# Patient Record
Sex: FEMALE | Race: BLACK OR AFRICAN AMERICAN
[De-identification: names, ages, dates, MRNs, and addresses within clinical notes are randomized per-mention and may not be internally consistent; named-entity substitution may affect disease eponyms.]

---

## 2017-08-18 ENCOUNTER — HOSPITAL ENCOUNTER (OUTPATIENT)
Dept: HOSPITAL 35 - CAT | Age: 52
End: 2017-08-18
Attending: FAMILY MEDICINE
Payer: COMMERCIAL

## 2017-08-18 DIAGNOSIS — K76.89: Primary | ICD-10-CM

## 2017-09-15 ENCOUNTER — HOSPITAL ENCOUNTER (OUTPATIENT)
Dept: HOSPITAL 35 - PAIN | Age: 52
Discharge: HOME | End: 2017-09-15
Attending: ANESTHESIOLOGY
Payer: COMMERCIAL

## 2017-09-15 VITALS — HEIGHT: 64.02 IN | WEIGHT: 232 LBS | BODY MASS INDEX: 39.61 KG/M2

## 2017-09-15 VITALS — DIASTOLIC BLOOD PRESSURE: 94 MMHG | SYSTOLIC BLOOD PRESSURE: 158 MMHG

## 2017-09-15 DIAGNOSIS — M54.16: Primary | ICD-10-CM

## 2017-09-15 DIAGNOSIS — J45.909: ICD-10-CM

## 2017-09-15 DIAGNOSIS — K21.9: ICD-10-CM

## 2017-09-28 ENCOUNTER — HOSPITAL ENCOUNTER (OUTPATIENT)
Dept: HOSPITAL 35 - PAIN | Age: 52
Discharge: HOME | End: 2017-09-28
Attending: ANESTHESIOLOGY
Payer: COMMERCIAL

## 2017-09-28 VITALS — SYSTOLIC BLOOD PRESSURE: 149 MMHG | DIASTOLIC BLOOD PRESSURE: 90 MMHG

## 2017-09-28 VITALS — WEIGHT: 233.8 LBS | BODY MASS INDEX: 39.91 KG/M2 | HEIGHT: 64.02 IN

## 2017-09-28 DIAGNOSIS — M54.16: Primary | ICD-10-CM

## 2017-12-21 ENCOUNTER — HOSPITAL ENCOUNTER (OUTPATIENT)
Dept: HOSPITAL 35 - PAIN | Age: 52
End: 2017-12-21
Attending: ANESTHESIOLOGY
Payer: COMMERCIAL

## 2017-12-21 VITALS — BODY MASS INDEX: 39.95 KG/M2 | WEIGHT: 234 LBS | HEIGHT: 64.02 IN

## 2017-12-21 VITALS — DIASTOLIC BLOOD PRESSURE: 88 MMHG | SYSTOLIC BLOOD PRESSURE: 133 MMHG

## 2017-12-21 DIAGNOSIS — M79.1: ICD-10-CM

## 2017-12-21 DIAGNOSIS — M16.12: Primary | ICD-10-CM

## 2017-12-21 DIAGNOSIS — M54.16: ICD-10-CM

## 2017-12-21 DIAGNOSIS — M25.752: ICD-10-CM

## 2017-12-29 ENCOUNTER — HOSPITAL ENCOUNTER (OUTPATIENT)
Dept: HOSPITAL 35 - PAIN | Age: 52
Discharge: HOME | End: 2017-12-29
Payer: COMMERCIAL

## 2017-12-29 VITALS — SYSTOLIC BLOOD PRESSURE: 131 MMHG | DIASTOLIC BLOOD PRESSURE: 84 MMHG

## 2017-12-29 VITALS — HEIGHT: 64.02 IN | WEIGHT: 233 LBS | BODY MASS INDEX: 39.78 KG/M2

## 2017-12-29 DIAGNOSIS — G89.29: ICD-10-CM

## 2017-12-29 DIAGNOSIS — M16.11: ICD-10-CM

## 2017-12-29 DIAGNOSIS — M54.16: ICD-10-CM

## 2017-12-29 DIAGNOSIS — Z88.8: ICD-10-CM

## 2017-12-29 DIAGNOSIS — Z79.899: ICD-10-CM

## 2017-12-29 DIAGNOSIS — Z88.6: ICD-10-CM

## 2017-12-29 DIAGNOSIS — M79.1: Primary | ICD-10-CM

## 2018-01-15 ENCOUNTER — HOSPITAL ENCOUNTER (OUTPATIENT)
Dept: HOSPITAL 35 - PAIN | Age: 53
End: 2018-01-15
Attending: ANESTHESIOLOGY
Payer: COMMERCIAL

## 2018-01-15 VITALS — DIASTOLIC BLOOD PRESSURE: 80 MMHG | SYSTOLIC BLOOD PRESSURE: 126 MMHG

## 2018-01-15 VITALS — HEIGHT: 64.02 IN | BODY MASS INDEX: 38.58 KG/M2 | WEIGHT: 226 LBS

## 2018-01-15 DIAGNOSIS — Z79.899: ICD-10-CM

## 2018-01-15 DIAGNOSIS — M79.1: ICD-10-CM

## 2018-01-15 DIAGNOSIS — M54.16: Primary | ICD-10-CM

## 2018-02-12 ENCOUNTER — HOSPITAL ENCOUNTER (OUTPATIENT)
Dept: HOSPITAL 35 - PAIN | Age: 53
Discharge: HOME | End: 2018-02-12
Attending: ANESTHESIOLOGY
Payer: COMMERCIAL

## 2018-02-12 VITALS — BODY MASS INDEX: 39.44 KG/M2 | HEIGHT: 64.02 IN | WEIGHT: 231 LBS

## 2018-02-12 VITALS — DIASTOLIC BLOOD PRESSURE: 80 MMHG | SYSTOLIC BLOOD PRESSURE: 131 MMHG

## 2018-02-12 DIAGNOSIS — Z88.8: ICD-10-CM

## 2018-02-12 DIAGNOSIS — Z79.899: ICD-10-CM

## 2018-02-12 DIAGNOSIS — M79.1: Primary | ICD-10-CM

## 2018-02-12 DIAGNOSIS — Z90.710: ICD-10-CM

## 2018-02-12 DIAGNOSIS — G89.29: ICD-10-CM

## 2018-02-12 DIAGNOSIS — Z98.890: ICD-10-CM

## 2018-04-19 ENCOUNTER — HOSPITAL ENCOUNTER (OUTPATIENT)
Dept: HOSPITAL 35 - PAIN | Age: 53
End: 2018-04-19
Attending: ANESTHESIOLOGY
Payer: COMMERCIAL

## 2018-04-19 VITALS — HEIGHT: 64.02 IN | BODY MASS INDEX: 40.29 KG/M2 | WEIGHT: 236 LBS

## 2018-04-19 VITALS — SYSTOLIC BLOOD PRESSURE: 141 MMHG | DIASTOLIC BLOOD PRESSURE: 91 MMHG
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
## 2018-04-19 DIAGNOSIS — M54.16: Primary | ICD-10-CM

## 2018-04-19 DIAGNOSIS — M70.61: ICD-10-CM

## 2018-04-27 ENCOUNTER — HOSPITAL ENCOUNTER (OUTPATIENT)
Dept: HOSPITAL 35 - PAIN | Age: 53
Discharge: HOME | End: 2018-04-27
Attending: ANESTHESIOLOGY
Payer: COMMERCIAL

## 2018-04-27 VITALS — SYSTOLIC BLOOD PRESSURE: 150 MMHG | DIASTOLIC BLOOD PRESSURE: 84 MMHG

## 2018-04-27 VITALS — WEIGHT: 233 LBS | HEIGHT: 64.02 IN | BODY MASS INDEX: 39.78 KG/M2

## 2018-04-27 DIAGNOSIS — Z98.890: ICD-10-CM

## 2018-04-27 DIAGNOSIS — G89.29: ICD-10-CM

## 2018-04-27 DIAGNOSIS — Z90.710: ICD-10-CM

## 2018-04-27 DIAGNOSIS — Z79.899: ICD-10-CM

## 2018-04-27 DIAGNOSIS — Z88.8: ICD-10-CM

## 2018-04-27 DIAGNOSIS — M16.11: Primary | ICD-10-CM

## 2018-04-27 DIAGNOSIS — Z79.891: ICD-10-CM

## 2018-10-17 ENCOUNTER — HOSPITAL ENCOUNTER (OUTPATIENT)
Dept: HOSPITAL 35 - PAIN | Age: 53
End: 2018-10-17
Payer: COMMERCIAL

## 2018-10-17 VITALS — SYSTOLIC BLOOD PRESSURE: 142 MMHG | DIASTOLIC BLOOD PRESSURE: 82 MMHG

## 2018-10-17 VITALS — HEIGHT: 64.02 IN | BODY MASS INDEX: 39.57 KG/M2 | WEIGHT: 231.8 LBS

## 2018-10-17 DIAGNOSIS — M25.562: ICD-10-CM

## 2018-10-17 DIAGNOSIS — M25.552: Primary | ICD-10-CM

## 2018-10-17 DIAGNOSIS — G89.29: ICD-10-CM

## 2018-10-17 DIAGNOSIS — Z79.899: ICD-10-CM

## 2018-10-17 DIAGNOSIS — M79.652: ICD-10-CM

## 2019-01-21 ENCOUNTER — HOSPITAL ENCOUNTER (OUTPATIENT)
Dept: HOSPITAL 35 - RAD | Age: 54
End: 2019-01-21
Attending: FAMILY MEDICINE
Payer: COMMERCIAL

## 2019-01-21 DIAGNOSIS — Z12.31: Primary | ICD-10-CM

## 2020-01-20 ENCOUNTER — HOSPITAL ENCOUNTER (OUTPATIENT)
Dept: HOSPITAL 35 - BC | Age: 55
End: 2020-01-20
Attending: FAMILY MEDICINE
Payer: COMMERCIAL

## 2020-01-20 DIAGNOSIS — Z12.31: Primary | ICD-10-CM

## 2021-01-18 ENCOUNTER — HOSPITAL ENCOUNTER (OUTPATIENT)
Dept: HOSPITAL 35 - RAD | Age: 56
End: 2021-01-18
Attending: FAMILY MEDICINE
Payer: COMMERCIAL

## 2021-01-18 DIAGNOSIS — Z12.31: Primary | ICD-10-CM

## 2021-07-09 ENCOUNTER — HOSPITAL ENCOUNTER (OUTPATIENT)
Dept: HOSPITAL 35 - PAIN | Age: 56
Discharge: HOME | End: 2021-07-09
Payer: COMMERCIAL

## 2021-07-09 VITALS — DIASTOLIC BLOOD PRESSURE: 76 MMHG | SYSTOLIC BLOOD PRESSURE: 125 MMHG

## 2021-07-09 VITALS — BODY MASS INDEX: 38.41 KG/M2 | HEIGHT: 64.02 IN | WEIGHT: 225 LBS

## 2021-07-09 DIAGNOSIS — M54.16: Primary | ICD-10-CM

## 2021-07-09 DIAGNOSIS — J45.909: ICD-10-CM

## 2021-07-09 DIAGNOSIS — Z90.49: ICD-10-CM

## 2021-07-09 DIAGNOSIS — I10: ICD-10-CM

## 2021-07-09 DIAGNOSIS — Z98.890: ICD-10-CM

## 2021-07-09 DIAGNOSIS — D64.9: ICD-10-CM

## 2021-07-09 DIAGNOSIS — K21.9: ICD-10-CM

## 2021-07-09 DIAGNOSIS — Z88.8: ICD-10-CM

## 2021-07-09 DIAGNOSIS — Z79.899: ICD-10-CM

## 2021-07-09 DIAGNOSIS — M19.90: ICD-10-CM

## 2021-07-09 DIAGNOSIS — G89.29: ICD-10-CM

## 2021-07-09 DIAGNOSIS — Z98.51: ICD-10-CM

## 2021-07-09 NOTE — NUR
Pain Clinic Assessment:
 
1. History of Osteoarthritis:
Left Lower Extremity
Right Lower Extremity
   History of Rheumatoid Arthritis:
Not Applicable
 
2. Height: 5 ft. 4 in. 162.6 cm.
   Weight: 225.0 lb.  oz. 102.060 kg.
   Patient's BMI: 38.6
 
3. Vital Signs:
   BP: 125/76 Pulse: 95 Resp: 16
   Temp:  02 Sat: 100 ECG Mon:
 
4. Pain Intensity: 6
 
5. Fall Risk:
   Dizziness: N  Needs help standing or walking: N
   Fallen in the last 3 months: N
   Fall risk comments:
 
 
6. Patient on Blood Thinner: None
 
7. History of Hypertension: Y
 
8. Opioid Therapy greater than 6 weeks: N
   Opiate Contract Signed:
 
9. Risk Assessment Tool Provided: 1 LOW RISK
 
10. Functional Assessment Tool: 44/70
 
11. Recreational Drug Use: Never Drug Type:
    Tobacco Use: Never Smoker Tobacco Type:
       Amount or Packs/day:  How Many Years:
    Alcohol Use: Yes  Frequency: Special Occasions Quant: 2

## 2021-10-11 ENCOUNTER — HOSPITAL ENCOUNTER (OUTPATIENT)
Dept: HOSPITAL 35 - RAD | Age: 56
End: 2021-10-11
Attending: INTERNAL MEDICINE
Payer: COMMERCIAL

## 2021-10-11 DIAGNOSIS — R06.00: Primary | ICD-10-CM

## 2021-11-12 ENCOUNTER — HOSPITAL ENCOUNTER (OUTPATIENT)
Dept: HOSPITAL 35 - PAIN | Age: 56
Discharge: HOME | End: 2021-11-12
Payer: COMMERCIAL

## 2021-11-12 VITALS — BODY MASS INDEX: 39.81 KG/M2 | WEIGHT: 233.2 LBS | HEIGHT: 64.02 IN

## 2021-11-12 VITALS — DIASTOLIC BLOOD PRESSURE: 77 MMHG | SYSTOLIC BLOOD PRESSURE: 128 MMHG

## 2021-11-12 DIAGNOSIS — Z90.49: ICD-10-CM

## 2021-11-12 DIAGNOSIS — I10: ICD-10-CM

## 2021-11-12 DIAGNOSIS — D64.9: ICD-10-CM

## 2021-11-12 DIAGNOSIS — Z90.710: ICD-10-CM

## 2021-11-12 DIAGNOSIS — Z98.51: ICD-10-CM

## 2021-11-12 DIAGNOSIS — Z88.8: ICD-10-CM

## 2021-11-12 DIAGNOSIS — K21.9: ICD-10-CM

## 2021-11-12 DIAGNOSIS — M54.16: Primary | ICD-10-CM

## 2021-11-12 DIAGNOSIS — Z98.890: ICD-10-CM

## 2021-11-12 DIAGNOSIS — M19.90: ICD-10-CM

## 2021-11-12 DIAGNOSIS — J45.909: ICD-10-CM

## 2021-11-12 DIAGNOSIS — G89.29: ICD-10-CM

## 2021-11-12 DIAGNOSIS — Z79.899: ICD-10-CM

## 2021-11-12 NOTE — NUR
Pain Clinic Assessment:
 
1. History of Osteoarthritis:
Left Lower Extremity
Right Lower Extremity
   History of Rheumatoid Arthritis:
Not Applicable
 
2. Height: 5 ft. 4 in. 162.6 cm.
   Weight: 233.2 lb.  oz. 105.779 kg.
   Patient's BMI: 40.0
 
3. Vital Signs:
   BP: 128/77 Pulse: 99 Resp: 16
   Temp:  02 Sat: 100 ECG Mon:
 
4. Pain Intensity: 7-bending 1-sitting
 
5. Fall Risk:
   Dizziness: N  Needs help standing or walking: N
   Fallen in the last 3 months: N
   Fall risk comments:
 
 
6. Patient on Blood Thinner: None
 
7. History of Hypertension: Y
 
8. Opioid Therapy greater than 6 weeks: N
   Opiate Contract Signed:
 
9. Risk Assessment Tool Provided: 1 LOW RISK
 
10. Functional Assessment Tool: 44/70
 
11. Recreational Drug Use: Never Drug Type:
    Tobacco Use: Never Smoker Tobacco Type:
       Amount or Packs/day:  How Many Years:
    Alcohol Use: Yes  Frequency: Special Occasions Quant:

## 2022-01-06 ENCOUNTER — HOSPITAL ENCOUNTER (EMERGENCY)
Dept: HOSPITAL 35 - ER | Age: 57
LOS: 1 days | Discharge: HOME | End: 2022-01-07
Payer: COMMERCIAL

## 2022-01-06 VITALS — BODY MASS INDEX: 31.65 KG/M2 | HEIGHT: 65 IN | WEIGHT: 189.99 LBS

## 2022-01-06 DIAGNOSIS — Z98.890: ICD-10-CM

## 2022-01-06 DIAGNOSIS — Z79.899: ICD-10-CM

## 2022-01-06 DIAGNOSIS — Z91.041: ICD-10-CM

## 2022-01-06 DIAGNOSIS — R55: Primary | ICD-10-CM

## 2022-01-06 DIAGNOSIS — J45.909: ICD-10-CM

## 2022-01-06 DIAGNOSIS — Z90.710: ICD-10-CM

## 2022-01-06 DIAGNOSIS — Z90.49: ICD-10-CM

## 2022-01-06 DIAGNOSIS — Z91.09: ICD-10-CM

## 2022-01-06 DIAGNOSIS — Z88.6: ICD-10-CM

## 2022-01-06 DIAGNOSIS — Z88.5: ICD-10-CM

## 2022-01-06 DIAGNOSIS — Z88.8: ICD-10-CM

## 2022-01-06 DIAGNOSIS — Z98.51: ICD-10-CM

## 2022-01-06 DIAGNOSIS — Z79.51: ICD-10-CM

## 2022-01-07 VITALS — DIASTOLIC BLOOD PRESSURE: 70 MMHG | SYSTOLIC BLOOD PRESSURE: 151 MMHG

## 2022-01-07 LAB
ANION GAP SERPL CALC-SCNC: 10 MMOL/L (ref 7–16)
BILIRUB UR-MCNC: NEGATIVE MG/DL
BUN SERPL-MCNC: 16 MG/DL (ref 7–18)
CALCIUM SERPL-MCNC: 9.5 MG/DL (ref 8.5–10.1)
CHLORIDE SERPL-SCNC: 103 MMOL/L (ref 98–107)
CO2 SERPL-SCNC: 27 MMOL/L (ref 21–32)
COLOR UR: YELLOW
CREAT SERPL-MCNC: 0.8 MG/DL (ref 0.6–1)
ERYTHROCYTE [DISTWIDTH] IN BLOOD BY AUTOMATED COUNT: 17.5 % (ref 10.5–14.5)
GLUCOSE SERPL-MCNC: 129 MG/DL (ref 74–106)
HCT VFR BLD CALC: 33.9 % (ref 37–47)
HGB BLD-MCNC: 10.9 GM/DL (ref 12–15)
KETONES UR STRIP-MCNC: NEGATIVE MG/DL
MCH RBC QN AUTO: 25.7 PG (ref 26–34)
MCHC RBC AUTO-ENTMCNC: 32 G/DL (ref 28–37)
MCV RBC: 80.1 FL (ref 80–100)
PLATELET # BLD: 347 THOU/UL (ref 150–400)
POTASSIUM SERPL-SCNC: 3.8 MMOL/L (ref 3.5–5.1)
RBC # BLD AUTO: 4.23 MIL/UL (ref 4.2–5)
RBC # UR STRIP: NEGATIVE /UL
SODIUM SERPL-SCNC: 140 MMOL/L (ref 136–145)
SP GR UR STRIP: 1.02 (ref 1–1.03)
URINE CLARITY: CLEAR
URINE GLUCOSE-RANDOM*: NEGATIVE
URINE LEUKOCYTES-REFLEX: NEGATIVE
URINE NITRITE-REFLEX: NEGATIVE
URINE PROTEIN (DIPSTICK): NEGATIVE
UROBILINOGEN UR STRIP-ACNC: 0.2 E.U./DL (ref 0.2–1)
WBC # BLD AUTO: 14.9 THOU/UL (ref 4–11)

## 2022-01-07 NOTE — EKG
[address]  [address]
Phone:  [phone]                    ELECTROCARDIOGRAM REPORT      
_______________________________________________________________________________
 
Name:       JASMIN MCDUFFIE     [facility] #[facility]                     [id number]     Account #:      [id number]  
Admission:  22    Attend Phys:                          
Discharge:  22    Date of Birth:  [birth year]  
                                                          Report #: [id number]
   [id number]
_______________________________________________________________________________
                         [facility] ED
                                       
Test Date:    2022               Test Time:    21:17:48
Pat Name:     JASMIN MCDUFFIE       Department:   
Patient ID:   [id number]            Room:          
Gender:       F                        Technician:   HAMAVERICK
:          1965               Requested By: Wilber Dykes
Order Number: [id number] MD:   Gurmeet Wade
                                 Measurements
Intervals                              Axis          
Rate:         88                       P:            52
WV:           145                      QRS:          38
QRSD:         86                       T:            26
QT:           389                                    
QTc:          471                                    
                           Interpretive Statements
Sinus rhythm
No previous ECG available for comparison
Electronically Signed On 2022 7:46:59 CST by Gurmeet Wade
[email or web address]
 
 
 
 
 
 
 
 
 
 
 
 
 
 
 
 
 
 
 
 
 
 
 
  <ELECTRONICALLY SIGNED>
   By: Gurmeet Wade MD, [facility]    
  22     0746
D: 22 2117                           _____________________________________
T: 22                           Gurmeet Wade MD, FACC      /EPI

## 2022-01-17 ENCOUNTER — HOSPITAL ENCOUNTER (OUTPATIENT)
Dept: HOSPITAL 35 - BC | Age: 57
End: 2022-01-17
Attending: FAMILY MEDICINE
Payer: COMMERCIAL

## 2022-01-17 DIAGNOSIS — Z12.31: Primary | ICD-10-CM

## 2022-01-17 DIAGNOSIS — N63.20: ICD-10-CM

## 2022-01-17 DIAGNOSIS — N64.89: ICD-10-CM

## 2022-01-21 ENCOUNTER — HOSPITAL ENCOUNTER (OUTPATIENT)
Dept: HOSPITAL 35 - ULTRA | Age: 57
End: 2022-01-21
Attending: FAMILY MEDICINE
Payer: COMMERCIAL

## 2022-01-21 DIAGNOSIS — N63.21: Primary | ICD-10-CM

## 2022-01-21 DIAGNOSIS — N60.02: ICD-10-CM

## 2022-02-09 ENCOUNTER — HOSPITAL ENCOUNTER (OUTPATIENT)
Dept: HOSPITAL 35 - CAT | Age: 57
End: 2022-02-09
Attending: INTERNAL MEDICINE
Payer: COMMERCIAL

## 2022-02-09 ENCOUNTER — HOSPITAL ENCOUNTER (OUTPATIENT)
Dept: HOSPITAL 35 - PAIN | Age: 57
Discharge: HOME | End: 2022-02-09
Payer: COMMERCIAL

## 2022-02-09 VITALS — HEIGHT: 64.02 IN | WEIGHT: 232 LBS | BODY MASS INDEX: 39.61 KG/M2

## 2022-02-09 VITALS — DIASTOLIC BLOOD PRESSURE: 77 MMHG | SYSTOLIC BLOOD PRESSURE: 146 MMHG

## 2022-02-09 DIAGNOSIS — Z90.710: ICD-10-CM

## 2022-02-09 DIAGNOSIS — I25.10: ICD-10-CM

## 2022-02-09 DIAGNOSIS — Z79.899: ICD-10-CM

## 2022-02-09 DIAGNOSIS — Z98.890: ICD-10-CM

## 2022-02-09 DIAGNOSIS — M19.90: ICD-10-CM

## 2022-02-09 DIAGNOSIS — M54.16: Primary | ICD-10-CM

## 2022-02-09 DIAGNOSIS — E78.00: ICD-10-CM

## 2022-02-09 DIAGNOSIS — Z13.6: Primary | ICD-10-CM

## 2022-02-09 DIAGNOSIS — G89.29: ICD-10-CM

## 2022-02-09 NOTE — NUR
Pain Clinic Assessment:
 
1. History of Osteoarthritis:
Left Lower Extremity
Right Lower Extremity
   History of Rheumatoid Arthritis:
Not Applicable
 
2. Height: 5 ft. 4 in. 162.6 cm.
   Weight: 232.0 lb.  oz. 105.235 kg.
   Patient's BMI: 39.8
 
3. Vital Signs:
   BP: 146/77 Pulse: 96 Resp: 16
   Temp:  02 Sat: 98 ECG Mon:
 
4. Pain Intensity: 5
 
5. Fall Risk:
   Dizziness: N  Needs help standing or walking: N
   Fallen in the last 3 months: N
   Fall risk comments:
 
 
6. Patient on Blood Thinner: None
 
7. History of Hypertension: Y
 
8. Opioid Therapy greater than 6 weeks: N
   Opiate Contract Signed:
 
9. Risk Assessment Tool Provided: 1 LOW RISK
 
10. Functional Assessment Tool: 44/70
 
11. Recreational Drug Use: Never Drug Type:
    Tobacco Use: Never Smoker Tobacco Type:
       Amount or Packs/day:  How Many Years:
    Alcohol Use: Yes  Frequency:  Quant: